# Patient Record
Sex: FEMALE
[De-identification: names, ages, dates, MRNs, and addresses within clinical notes are randomized per-mention and may not be internally consistent; named-entity substitution may affect disease eponyms.]

---

## 2021-07-15 ENCOUNTER — NURSE TRIAGE (OUTPATIENT)
Dept: OTHER | Facility: CLINIC | Age: 66
End: 2021-07-15

## 2021-07-15 NOTE — TELEPHONE ENCOUNTER
Reason for Disposition   Can't stand (bear weight) or walk    Answer Assessment - Initial Assessment Questions  1. MECHANISM: \"How did the injury happen? \" (e.g., twisting injury, direct blow)       States fell outside last night    2. ONSET: \"When did the injury happen? \" (Minutes or hours ago)       9pm last night    3. LOCATION: \"Where is the injury located? \"       left knee and left hip    4. APPEARANCE of INJURY: \"What does the injury look like? \"       Knee has bruising and swelling    5. SEVERITY: \"Can you put weight on that leg? \" \"Can you walk? \"       States cant bear weight    6. SIZE: For cuts, bruises, or swelling, ask: \"How large is it? \" (e.g., inches or centimeters;  entire joint)       Elbow and wrisit has abrasion    7. PAIN: \"Is there pain? \" If so, ask: \"How bad is the pain? \"    (e.g., Scale 1-10; or mild, moderate, severe)      10/10    8. TETANUS: For any breaks in the skin, ask: \"When was the last tetanus booster? \"      Unsure    9. OTHER SYMPTOMS: \"Do you have any other symptoms? \"  (e.g., \"pop\" when knee injured, swelling, locking, buckling)       Swelling and cant bend    10. PREGNANCY: \"Is there any chance you are pregnant? \" \"When was your last menstrual period? \"        N/a    Protocols used: KNEE INJURY-ADULT-[place]  Brief description of triage: as above pt states fell last night in driveway c/o left knee and left hip pain states unable to bear weight rates as 10/10 has bruising and swelling of left knee    Triage indicates for patient to [id number] advice provided, patient verbalizes understanding; denies any other questions or concerns; instructed to call back for any new or worsening symptoms. Attention Provider: Thank you for allowing me to participate in the care of your patient. The patient was connected to triage in response to symptoms provided. Please do not respond through this encounter as the response is not directed to a shared pool.